# Patient Record
Sex: FEMALE | Race: OTHER | NOT HISPANIC OR LATINO | URBAN - METROPOLITAN AREA
[De-identification: names, ages, dates, MRNs, and addresses within clinical notes are randomized per-mention and may not be internally consistent; named-entity substitution may affect disease eponyms.]

---

## 2018-01-08 ENCOUNTER — EMERGENCY (EMERGENCY)
Facility: HOSPITAL | Age: 43
LOS: 1 days | End: 2018-01-08
Attending: EMERGENCY MEDICINE | Admitting: EMERGENCY MEDICINE
Payer: COMMERCIAL

## 2018-01-08 VITALS
SYSTOLIC BLOOD PRESSURE: 108 MMHG | DIASTOLIC BLOOD PRESSURE: 66 MMHG | HEART RATE: 69 BPM | TEMPERATURE: 98 F | OXYGEN SATURATION: 100 % | RESPIRATION RATE: 15 BRPM

## 2018-01-08 VITALS
DIASTOLIC BLOOD PRESSURE: 74 MMHG | SYSTOLIC BLOOD PRESSURE: 115 MMHG | TEMPERATURE: 98 F | WEIGHT: 119.05 LBS | HEIGHT: 62 IN | HEART RATE: 73 BPM | RESPIRATION RATE: 18 BRPM | OXYGEN SATURATION: 99 %

## 2018-01-08 LAB
ALBUMIN SERPL ELPH-MCNC: 4.4 G/DL — SIGNIFICANT CHANGE UP (ref 3.3–5)
ALP SERPL-CCNC: 44 U/L — SIGNIFICANT CHANGE UP (ref 40–120)
ALT FLD-CCNC: 12 U/L RC — SIGNIFICANT CHANGE UP (ref 10–45)
ANION GAP SERPL CALC-SCNC: 11 MMOL/L — SIGNIFICANT CHANGE UP (ref 5–17)
AST SERPL-CCNC: 22 U/L — SIGNIFICANT CHANGE UP (ref 10–40)
BILIRUB SERPL-MCNC: 0.5 MG/DL — SIGNIFICANT CHANGE UP (ref 0.2–1.2)
BUN SERPL-MCNC: 10 MG/DL — SIGNIFICANT CHANGE UP (ref 7–23)
CALCIUM SERPL-MCNC: 9.2 MG/DL — SIGNIFICANT CHANGE UP (ref 8.4–10.5)
CHLORIDE SERPL-SCNC: 103 MMOL/L — SIGNIFICANT CHANGE UP (ref 96–108)
CO2 SERPL-SCNC: 25 MMOL/L — SIGNIFICANT CHANGE UP (ref 22–31)
CREAT SERPL-MCNC: 0.64 MG/DL — SIGNIFICANT CHANGE UP (ref 0.5–1.3)
GLUCOSE SERPL-MCNC: 90 MG/DL — SIGNIFICANT CHANGE UP (ref 70–99)
HCG UR QL: NEGATIVE — SIGNIFICANT CHANGE UP
MAGNESIUM SERPL-MCNC: 1.9 MG/DL — SIGNIFICANT CHANGE UP (ref 1.6–2.6)
PHOSPHATE SERPL-MCNC: 3.2 MG/DL — SIGNIFICANT CHANGE UP (ref 2.5–4.5)
POTASSIUM SERPL-MCNC: 4.2 MMOL/L — SIGNIFICANT CHANGE UP (ref 3.5–5.3)
POTASSIUM SERPL-SCNC: 4.2 MMOL/L — SIGNIFICANT CHANGE UP (ref 3.5–5.3)
PROT SERPL-MCNC: 7.6 G/DL — SIGNIFICANT CHANGE UP (ref 6–8.3)
SODIUM SERPL-SCNC: 139 MMOL/L — SIGNIFICANT CHANGE UP (ref 135–145)

## 2018-01-08 PROCEDURE — 84100 ASSAY OF PHOSPHORUS: CPT

## 2018-01-08 PROCEDURE — 99284 EMERGENCY DEPT VISIT MOD MDM: CPT

## 2018-01-08 PROCEDURE — 81025 URINE PREGNANCY TEST: CPT

## 2018-01-08 PROCEDURE — 99284 EMERGENCY DEPT VISIT MOD MDM: CPT | Mod: 25

## 2018-01-08 PROCEDURE — 80053 COMPREHEN METABOLIC PANEL: CPT

## 2018-01-08 PROCEDURE — 83735 ASSAY OF MAGNESIUM: CPT

## 2018-01-08 PROCEDURE — 96374 THER/PROPH/DIAG INJ IV PUSH: CPT

## 2018-01-08 RX ORDER — SODIUM CHLORIDE 9 MG/ML
2000 INJECTION INTRAMUSCULAR; INTRAVENOUS; SUBCUTANEOUS ONCE
Qty: 0 | Refills: 0 | Status: COMPLETED | OUTPATIENT
Start: 2018-01-08 | End: 2018-01-08

## 2018-01-08 RX ORDER — MECLIZINE HCL 12.5 MG
25 TABLET ORAL ONCE
Qty: 0 | Refills: 0 | Status: COMPLETED | OUTPATIENT
Start: 2018-01-08 | End: 2018-01-08

## 2018-01-08 RX ORDER — ONDANSETRON 8 MG/1
4 TABLET, FILM COATED ORAL ONCE
Qty: 0 | Refills: 0 | Status: COMPLETED | OUTPATIENT
Start: 2018-01-08 | End: 2018-01-08

## 2018-01-08 RX ADMIN — Medication 25 MILLIGRAM(S): at 14:25

## 2018-01-08 RX ADMIN — SODIUM CHLORIDE 1333.33 MILLILITER(S): 9 INJECTION INTRAMUSCULAR; INTRAVENOUS; SUBCUTANEOUS at 14:14

## 2018-01-08 NOTE — ED ADULT NURSE NOTE - OBJECTIVE STATEMENT
43 y/o female presenting to ED c/o headaches, and dizziness since this morning. pt.states symptoms have worsened since this morning. denies chest pain, sob, blurred vision, slurred speech,  n/v/d, abdominal pain, f/c, urinary symptoms, hematuria. A&Ox4 gross neuro intact, lungs cta bilaterally, no difficulty speaking in complete sentences, s1s2 heart sounds heard, pulses x 4, rose x4, abdomen soft nontender nondistended, skin intact. Safety and comfort measures maintained. Patient undressed and placed into gown, call bell in hand and side rails up for safety. warm blanket provided, vital signs stable, pt in no acute distress.

## 2018-01-08 NOTE — ED PROVIDER NOTE - CARE PLAN
Principal Discharge DX:	Vertigo  Instructions for follow-up, activity and diet:	1) You have vertigo. Please take the medication as needed for vertigo  2) Please follow-up with your primary care doctor within the next 3 days.  Please call today or tomorrow for an appointment.  If you cannot follow-up with your doctor(s), please return to the ED for any urgent issues.  2) If you have any worsening of symptoms or any other concerns please return to the ED immediately.  3) Please continue taking your home medications as directed.  4) You may have been given a copy of your labs and/or imaging.  Please go over these with your primary care doctor. Principal Discharge DX:	Vertigo  Instructions for follow-up, activity and diet:	1) You have vertigo. Please take the medication as needed for vertigo.   2) Please follow-up with your primary care doctor within the next 3 days.  Please call today or tomorrow for an appointment.  If you cannot follow-up with your doctor(s), please return to the ED for any urgent issues.  2) If you have any worsening of symptoms or any other concerns please return to the ED immediately.  3) Please continue taking your home medications as directed.  4) You may have been given a copy of your labs and/or imaging.  Please go over these with your primary care doctor.

## 2018-01-08 NOTE — ED PROVIDER NOTE - PLAN OF CARE
1) You have vertigo. Please take the medication as needed for vertigo  2) Please follow-up with your primary care doctor within the next 3 days.  Please call today or tomorrow for an appointment.  If you cannot follow-up with your doctor(s), please return to the ED for any urgent issues.  2) If you have any worsening of symptoms or any other concerns please return to the ED immediately.  3) Please continue taking your home medications as directed.  4) You may have been given a copy of your labs and/or imaging.  Please go over these with your primary care doctor. 1) You have vertigo. Please take the medication as needed for vertigo.   2) Please follow-up with your primary care doctor within the next 3 days.  Please call today or tomorrow for an appointment.  If you cannot follow-up with your doctor(s), please return to the ED for any urgent issues.  2) If you have any worsening of symptoms or any other concerns please return to the ED immediately.  3) Please continue taking your home medications as directed.  4) You may have been given a copy of your labs and/or imaging.  Please go over these with your primary care doctor.

## 2018-01-08 NOTE — ED PROVIDER NOTE - OBJECTIVE STATEMENT
43yo F, no pmh, p/w dizziness that started yesterday. Reports the room is spinning everytime she turns her head. Also reporting head heaviness. Denies f/c, visual disturbances, numb/tingling/weakness, URI symptoms, h/o stroke. Hasn't taken anything for the dizziness. Dizziness improves when she is sitting or not moving her head. Never happened before. Complaining of some L ear pain. Denies d/c.

## 2018-01-08 NOTE — ED ADULT NURSE REASSESSMENT NOTE - NS ED NURSE REASSESS COMMENT FT1
Pt. currently resting comfortably in hallway. NAD VSS at this time. Plan of care discussed with pt. Pt currently awaiting discharge paperwork. Safety and comfort maintained.

## 2018-01-08 NOTE — ED ADULT NURSE REASSESSMENT NOTE - NS ED NURSE REASSESS COMMENT FT1
Pt d/c home w/ written and verbal instructions. Pt verbalized understanding. IV d/c. No s&s of infection/infiltration. VSS. Pt states " I am ready to go home".

## 2018-01-08 NOTE — ED PROVIDER NOTE - ATTENDING CONTRIBUTION TO CARE
42 yof no pmhx presents w feeling of room spinning starting yesterday. states better w remaining still, worse w moving head and getting into upright position. no f/c, no headache but feels head is "heavy." + nausea, no vomiting. no vision changes or ringing in her hears. states has some pain near her left ear x weeks.     ROS:   constitutional - no fever, no chills  eyes - no visual changes, no redness  eent - no sore throat, no nasal congestion  cvs - no chest pain, no leg swelling  resp - no shortness of breath, no cough  gi - no abdominal pain, no vomiting, no diarrhea  gu - no dysuria, no hematuria  msk - no acute back pain, no joint swelling  skin - no rashes, no jaundice  neuro - no headache, no focal weakness  psych - no acute mental health issue     Physical Exam:   constitutional - well appearing, awake and alert, oriented x3  head - no external evidence of trauma  cvs - rrr, no murmurs, no peripheral edema  resp - breath sounds clear and equal bilat  gi - abdomen soft and nontender, no rigidity, guarding or rebound, bowel sounds present  msk - moving all extremities spontaneously  neuro - alert and oriented x3, no focal deficits, CNs 2-12 grossly intact. minimal rightward nystagmus. no pronator drift, strength 5/5 in all ext. gait stable, neg rombergs.   skin- no jaundice, warm and dry  psych - mood and affect wnl, no apparent risk to self or others     pt w likely peripheral vertigo. no focal findings to suggest intracranial pathology. pt is well appearing. will give ivf, zofran, meclizine, reassess. endorsed to dr Araujo at 1600 pending reassessment. SCOTTY Quinn MD

## 2018-01-08 NOTE — ED PROVIDER NOTE - MEDICAL DECISION MAKING DETAILS
43yo F, no pmh, p/w dizziness that started yesterday. Likely vertigo. No concerning findings on PE for central vertigo. Will eval with basics, ivfs, meclizine, reassess.

## 2018-01-08 NOTE — ED PROVIDER NOTE - PHYSICAL EXAMINATION
Vitals: WNL  Gen: very anxious  Head: NCAT  ENT: PERRLA, EOMI, sclerae white, anicterus, moist mucous membranes. No exudates, b/l TM clear/grey, no effusions, no erythema in the ear canal  CV: RRR. Audible S1 and S2. No murmurs, rubs, gallops, S3, nor S4, 2+ radial and DP pulses   Pulm: Clear to auscultation bilaterally. No wheezes, rales, or rhonchi  Abd: soft, normoactive BS x4, NTND, no rebound, no guarding, no rashes  Musculoskeletal:  No peripheral edema  Skin: no lesions or scars noted  Neurologic: AAOx3

## 2019-06-03 NOTE — ED ADULT TRIAGE NOTE - BANDS:
Allergy;

## 2024-06-04 PROBLEM — Z00.00 ENCOUNTER FOR PREVENTIVE HEALTH EXAMINATION: Status: ACTIVE | Noted: 2024-06-04

## 2024-06-05 ENCOUNTER — APPOINTMENT (OUTPATIENT)
Dept: ORTHOPEDIC SURGERY | Facility: CLINIC | Age: 49
End: 2024-06-05
Payer: COMMERCIAL

## 2024-06-05 VITALS — BODY MASS INDEX: 24.84 KG/M2 | HEIGHT: 62 IN | WEIGHT: 135 LBS

## 2024-06-05 DIAGNOSIS — Z78.9 OTHER SPECIFIED HEALTH STATUS: ICD-10-CM

## 2024-06-05 DIAGNOSIS — S90.32XA CONTUSION OF LEFT FOOT, INITIAL ENCOUNTER: ICD-10-CM

## 2024-06-05 PROCEDURE — 73650 X-RAY EXAM OF HEEL: CPT | Mod: LT

## 2024-06-05 PROCEDURE — 73610 X-RAY EXAM OF ANKLE: CPT | Mod: LT

## 2024-06-05 PROCEDURE — 99203 OFFICE O/P NEW LOW 30 MIN: CPT

## 2024-06-05 RX ORDER — METHYLPREDNISOLONE 4 MG/1
4 TABLET ORAL
Qty: 1 | Refills: 0 | Status: ACTIVE | COMMUNITY
Start: 2024-06-05 | End: 1900-01-01
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
## 2024-06-05 NOTE — PHYSICAL EXAM
[de-identified] : Constitutional: Well developed, well nourished, able to communicate Neuro: Normal sensation, No focal deficits Skin: Intact CV: Peripheral vascular exam grossly normal Pulm: No signs of respiratory distress Psych: Oriented, normal mood and affect

## 2024-06-05 NOTE — IMAGING
[de-identified] : L ankle/foot: - No obvious ankle or foot deformity - Pain to retrocalcaneal bursa - No pain to plantar fascia - Mild pain with palpation of achilles - No pain with palpation medial or lateral malleoli, base of 5th metatarsal, navicular, metatarsals, or digits - ROM intact throughout ankle dorsiflexion, plantarflexion, inversion, eversion. Toes with normal flexion and extension. - 5/5 strength throughout - Negative anterior drawer, ankle squeeze test - Negative metatarsal squeeze - Mild pain with calcaneal squeeze - No pain with soleus or gastroc activation - negative shaffer - Distally neurovascularly intact   R ankle/foot: - No obvious ankle or foot deformity - No pain with palpation of achilles, medial or lateral malleoli, base of 5th metatarsal, navicular, metatarsals, or digits - ROM intact throughout ankle dorsiflexion, plantarflexion, inversion, eversion. Toes with normal flexion and extension. - 5/5 strength throughout - Distally neurovascularly intact    [Left] : left calcaneus [There are no fractures, subluxations or dislocations. No significant abnormalities are seen] : There are no fractures, subluxations or dislocations. No significant abnormalities are seen
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
## 2024-06-18 ENCOUNTER — APPOINTMENT (OUTPATIENT)
Dept: ORTHOPEDIC SURGERY | Facility: CLINIC | Age: 49
End: 2024-06-18
Payer: COMMERCIAL

## 2024-06-18 DIAGNOSIS — M76.62 ACHILLES TENDINITIS, LEFT LEG: ICD-10-CM

## 2024-06-18 DIAGNOSIS — M77.52 OTHER ENTHESOPATHY OF LT FOOT AND ANKLE: ICD-10-CM

## 2024-06-18 PROCEDURE — 72110 X-RAY EXAM L-2 SPINE 4/>VWS: CPT

## 2024-06-18 PROCEDURE — 99214 OFFICE O/P EST MOD 30 MIN: CPT

## 2024-06-18 RX ORDER — MELOXICAM 15 MG/1
15 TABLET ORAL
Qty: 30 | Refills: 1 | Status: ACTIVE | COMMUNITY
Start: 2024-06-18 | End: 1900-01-01
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
## 2024-06-19 NOTE — IMAGING
[Left] : left calcaneus [There are no fractures, subluxations or dislocations. No significant abnormalities are seen] : There are no fractures, subluxations or dislocations. No significant abnormalities are seen [de-identified] : L ankle/foot: - No obvious ankle or foot deformity - Pain to retrocalcaneal bursa - No pain to plantar fascia - Mild pain with palpation of achilles - No pain with palpation medial or lateral malleoli, base of 5th metatarsal, navicular, metatarsals, or digits - ROM intact throughout ankle dorsiflexion, plantarflexion, inversion, eversion. Toes with normal flexion and extension. - 5/5 strength throughout - Negative anterior drawer, ankle squeeze test - Negative metatarsal squeeze - Mild pain with calcaneal squeeze - No pain with soleus or gastroc activation - negative shaffer - Distally neurovascularly intact   R ankle/foot: - No obvious ankle or foot deformity - No pain with palpation of achilles, medial or lateral malleoli, base of 5th metatarsal, navicular, metatarsals, or digits - ROM intact throughout ankle dorsiflexion, plantarflexion, inversion, eversion. Toes with normal flexion and extension. - 5/5 strength throughout - Distally neurovascularly intact  Back: - No obvious deformity - No pain with palpation of spinous processes or paraspinal musculature - No pain with SI palpation  - ROM intact throughout flexion, extension, sidebending, and rotation - 3+/5 strength with L ankle plantarflexion - 5/5 strength throughout LE evaluation bilaterally otherwise - Tightness felt down back of L leg to ankle with SLR - Negative straight leg raise R - Distally neurovascularly intact    [Disc space narrowing] : Disc space narrowing

## 2024-06-19 NOTE — PHYSICAL EXAM
[de-identified] : Constitutional: Well developed, well nourished, able to communicate Neuro: Normal sensation, No focal deficits Skin: Intact CV: Peripheral vascular exam grossly normal Pulm: No signs of respiratory distress Psych: Oriented, normal mood and affect
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
## 2024-07-01 DIAGNOSIS — M54.16 RADICULOPATHY, LUMBAR REGION: ICD-10-CM

## 2024-07-01 DIAGNOSIS — M47.26 OTHER SPONDYLOSIS WITH RADICULOPATHY, LUMBAR REGION: ICD-10-CM

## 2024-07-01 DIAGNOSIS — R29.898 OTHER SYMPTOMS AND SIGNS INVOLVING THE MUSCULOSKELETAL SYSTEM: ICD-10-CM

## 2024-07-11 ENCOUNTER — APPOINTMENT (OUTPATIENT)
Dept: NEUROLOGY | Facility: CLINIC | Age: 49
End: 2024-07-11
Payer: COMMERCIAL

## 2024-07-11 PROCEDURE — 95913 NRV CNDJ TEST 13/> STUDIES: CPT

## 2024-07-11 PROCEDURE — 95886 MUSC TEST DONE W/N TEST COMP: CPT

## 2024-07-23 ENCOUNTER — APPOINTMENT (OUTPATIENT)
Dept: ORTHOPEDIC SURGERY | Facility: CLINIC | Age: 49
End: 2024-07-23

## 2024-07-23 DIAGNOSIS — M54.16 RADICULOPATHY, LUMBAR REGION: ICD-10-CM

## 2024-07-23 DIAGNOSIS — R29.898 OTHER SYMPTOMS AND SIGNS INVOLVING THE MUSCULOSKELETAL SYSTEM: ICD-10-CM

## 2024-07-23 DIAGNOSIS — M77.52 OTHER ENTHESOPATHY OF LT FOOT AND ANKLE: ICD-10-CM

## 2024-07-23 DIAGNOSIS — G57.52 TARSAL TUNNEL SYNDROME, LEFT LOWER LIMB: ICD-10-CM

## 2024-07-23 DIAGNOSIS — M47.26 OTHER SPONDYLOSIS WITH RADICULOPATHY, LUMBAR REGION: ICD-10-CM

## 2024-07-23 DIAGNOSIS — M76.62 ACHILLES TENDINITIS, LEFT LEG: ICD-10-CM

## 2024-07-23 PROCEDURE — J3490M: CUSTOM

## 2024-07-23 PROCEDURE — 20550 NJX 1 TENDON SHEATH/LIGAMENT: CPT | Mod: LT

## 2024-07-23 PROCEDURE — 76942 ECHO GUIDE FOR BIOPSY: CPT

## 2024-07-23 PROCEDURE — 99214 OFFICE O/P EST MOD 30 MIN: CPT | Mod: 25

## 2024-07-23 NOTE — PHYSICAL EXAM
[de-identified] : Constitutional: Well developed, well nourished, able to communicate Neuro: Normal sensation, No focal deficits Skin: Intact CV: Peripheral vascular exam grossly normal Pulm: No signs of respiratory distress Psych: Oriented, normal mood and affect
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
## 2024-07-23 NOTE — IMAGING
[Disc space narrowing] : Disc space narrowing [de-identified] : L ankle/foot: - No obvious ankle or foot deformity - Pain to retrocalcaneal bursa, moreso medially - No pain to plantar fascia - No pain with palpation of achilles - No pain with palpation medial or lateral malleoli, base of 5th metatarsal, navicular, metatarsals, or digits - ROM intact throughout ankle dorsiflexion, plantarflexion, inversion, eversion. Toes with normal flexion and extension. - 5/5 strength throughout - Negative anterior drawer, ankle squeeze test - Negative metatarsal squeeze - Mild pain with calcaneal squeeze - No pain with soleus or gastroc activation - negative shaffer - Distally neurovascularly intact   R ankle/foot: - No obvious ankle or foot deformity - No pain with palpation of achilles, medial or lateral malleoli, base of 5th metatarsal, navicular, metatarsals, or digits - ROM intact throughout ankle dorsiflexion, plantarflexion, inversion, eversion. Toes with normal flexion and extension. - 5/5 strength throughout - Distally neurovascularly intact  Back: - No obvious deformity - No pain with palpation of spinous processes or paraspinal musculature - No pain with SI palpation  - ROM intact throughout flexion, extension, sidebending, and rotation - 5/5 strength throughout LE evaluation - Tightness felt down back of L leg to ankle with SLR - Negative straight leg raise R - Distally neurovascularly intact

## 2024-07-23 NOTE — PROCEDURE
[FreeTextEntry3] : A pre-procedure verification was performed by asking the patient to state their name, , and the location of the procedure being performed in their own words.  A review of allergies was accomplished through dialog and the patient, ending thus in the full agreement. The risks and benefits were explained and consent were obtained verbally.  Procedure: Ultrasound Guided Injection >>  Left tarsal tunnel Consent was obtained. Patients questions were answered to the best of my ability prior to procedure. Injection site and surrounding bony landmarks were palpated, and marked prior to proceeding. Target location and needle pathway identified under ultrasound. Ultrasound probe was sanitized in the typical fashion prior to application of sterile gel. Injection site was cleaned and prepped in the typical fashion using alcohol swabs. Cold spray used on skin for patient comfort. Site was identified once again with ultrasound. Needle was advanced into the tarsal tunnel space from the medial approach. A combination of 0.5cc Lidocaine w/o Epi, 0.5cc Marcaine w/o Epi and 40mg Depomedrol was then injected into the tarsal tunnel space. Patient tolerated the procedure well, without significant complications. Minimal (<3cc) blood loss experienced. Images were saved into patient's chart.

## 2024-07-23 NOTE — DATA REVIEWED
[Left] : left [Ankle] : ankle [MRI] : MRI [Lumbar Spine] : lumbar spine [Report was reviewed and noted in the chart] : The report was reviewed and noted in the chart [I reviewed the films/CD and agree] : I reviewed the films/CD and agree
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
## 2024-08-21 ENCOUNTER — APPOINTMENT (OUTPATIENT)
Dept: ORTHOPEDIC SURGERY | Facility: CLINIC | Age: 49
End: 2024-08-21
Payer: COMMERCIAL

## 2024-08-21 DIAGNOSIS — G57.52 TARSAL TUNNEL SYNDROME, LEFT LOWER LIMB: ICD-10-CM

## 2024-08-21 DIAGNOSIS — M47.26 OTHER SPONDYLOSIS WITH RADICULOPATHY, LUMBAR REGION: ICD-10-CM

## 2024-08-21 DIAGNOSIS — M76.62 ACHILLES TENDINITIS, LEFT LEG: ICD-10-CM

## 2024-08-21 DIAGNOSIS — M77.52 OTHER ENTHESOPATHY OF LT FOOT AND ANKLE: ICD-10-CM

## 2024-08-21 DIAGNOSIS — R29.898 OTHER SYMPTOMS AND SIGNS INVOLVING THE MUSCULOSKELETAL SYSTEM: ICD-10-CM

## 2024-08-21 PROCEDURE — 99213 OFFICE O/P EST LOW 20 MIN: CPT
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
## 2024-08-22 NOTE — PHYSICAL EXAM
[de-identified] : Constitutional: Well developed, well nourished, able to communicate Neuro: Normal sensation, No focal deficits Skin: Intact CV: Peripheral vascular exam grossly normal Pulm: No signs of respiratory distress Psych: Oriented, normal mood and affect

## 2024-08-22 NOTE — IMAGING
[Disc space narrowing] : Disc space narrowing [de-identified] : L ankle/foot: - No obvious ankle or foot deformity - mild Pain to retrocalcaneal bursa, moreso medially - No pain to plantar fascia - No pain with palpation of achilles - No pain with palpation medial or lateral malleoli, base of 5th metatarsal, navicular, metatarsals, or digits - ROM intact throughout ankle dorsiflexion, plantarflexion, inversion, eversion. Toes with normal flexion and extension. - 5/5 strength throughout - Negative anterior drawer, ankle squeeze test - Negative metatarsal squeeze - no pain with calcaneal squeeze - No pain with soleus or gastroc activation - negative shaffer - Distally neurovascularly intact   R ankle/foot: - No obvious ankle or foot deformity - No pain with palpation of achilles, medial or lateral malleoli, base of 5th metatarsal, navicular, metatarsals, or digits - ROM intact throughout ankle dorsiflexion, plantarflexion, inversion, eversion. Toes with normal flexion and extension. - 5/5 strength throughout - Distally neurovascularly intact  Back: - No obvious deformity - No pain with palpation of spinous processes or paraspinal musculature - No pain with SI palpation  - ROM intact throughout flexion, extension, sidebending, and rotation - 5/5 strength throughout LE evaluation - Tightness felt down back of L leg to ankle with SLR - Negative straight leg raise R - Distally neurovascularly intact

## 2024-08-22 NOTE — IMAGING
[Disc space narrowing] : Disc space narrowing [de-identified] : L ankle/foot: - No obvious ankle or foot deformity - mild Pain to retrocalcaneal bursa, moreso medially - No pain to plantar fascia - No pain with palpation of achilles - No pain with palpation medial or lateral malleoli, base of 5th metatarsal, navicular, metatarsals, or digits - ROM intact throughout ankle dorsiflexion, plantarflexion, inversion, eversion. Toes with normal flexion and extension. - 5/5 strength throughout - Negative anterior drawer, ankle squeeze test - Negative metatarsal squeeze - no pain with calcaneal squeeze - No pain with soleus or gastroc activation - negative shaffer - Distally neurovascularly intact   R ankle/foot: - No obvious ankle or foot deformity - No pain with palpation of achilles, medial or lateral malleoli, base of 5th metatarsal, navicular, metatarsals, or digits - ROM intact throughout ankle dorsiflexion, plantarflexion, inversion, eversion. Toes with normal flexion and extension. - 5/5 strength throughout - Distally neurovascularly intact  Back: - No obvious deformity - No pain with palpation of spinous processes or paraspinal musculature - No pain with SI palpation  - ROM intact throughout flexion, extension, sidebending, and rotation - 5/5 strength throughout LE evaluation - Tightness felt down back of L leg to ankle with SLR - Negative straight leg raise R - Distally neurovascularly intact

## 2024-08-22 NOTE — PHYSICAL EXAM
[de-identified] : Constitutional: Well developed, well nourished, able to communicate Neuro: Normal sensation, No focal deficits Skin: Intact CV: Peripheral vascular exam grossly normal Pulm: No signs of respiratory distress Psych: Oriented, normal mood and affect

## 2024-08-22 NOTE — ASSESSMENT
[FreeTextEntry1] : Now 4.5 months of left heel pain after hitting her left heel against someone's foot.  Seen by podiatry where she had x-rays and MRI completed.  X-ray without fracture.  MRI showing mild distal Achilles tendinosis and inflammation in the heel fat pad per report. Images personally reviewed and concur, mild achilles tendinopathy with longitudinal tear.  X-rays obtained of the ankle and calcaneus without acute or degenerative findings.  On exam, she has pain to the retrocalcaneal space and no pain over the Achilles tendon.  Today, no pain with calcaneal squeeze. Improved single heel raise against gravity. No improvement with medrol or camboot. XR of lumbar spine showing L5-S1 narrowing. MRI lumbar spine with DDD without significant nerve root impingement. NCS completed without significant findings. 60-70% better after tarsal tunnel CSI on 7/23/24 - Continue PT and HEP - Meloxicam qd as needed - 2nd opinion from Dr. Clark given prolonged and ongoing symptoms

## 2024-09-09 ENCOUNTER — APPOINTMENT (OUTPATIENT)
Dept: ORTHOPEDIC SURGERY | Facility: CLINIC | Age: 49
End: 2024-09-09
Payer: COMMERCIAL

## 2024-09-09 DIAGNOSIS — M67.874 OTHER SPECIFIED DISORDERS OF TENDON, LEFT ANKLE AND FOOT: ICD-10-CM

## 2024-09-09 PROCEDURE — 99204 OFFICE O/P NEW MOD 45 MIN: CPT

## 2024-09-09 NOTE — HISTORY OF PRESENT ILLNESS
[de-identified] : Patient is a 49 year old female here for her left Achilles. Reports she developed posterior ankle pain after her daughter accidently stepped on her heel May 2024. She has been evaluated by podiatry and by Dr. Villegas. WB in sneakers. Normal EMG. LT tarsal tunnel injection on 7/23/24 with some relief. Has tried Mobic and Medrol dose pack. She has been doing PT. Symptoms are improving. No previous injury/problem with LT ankle.  [] : no [FreeTextEntry1] : left achilles.

## 2024-09-09 NOTE — PHYSICAL EXAM
[Left] : left foot and ankle [NL (40)] : plantar flexion 40 degrees [NL 30)] : inversion 30 degrees [5___] : eversion 5[unfilled]/5 [2+] : posterior tibialis pulse: 2+ [Normal] : saphenous nerve sensation normal [NL (20)] : dorsiflexion 20 degrees [] : non-antalgic [TWNoteComboBox7] : False

## 2024-09-09 NOTE — DISCUSSION/SUMMARY
[de-identified] : WBAT in supportive footwear, heel lifts prn.   Recommend continue with HEP. Ice to affected area. NSAIDS prn.

## 2024-09-09 NOTE — DATA REVIEWED
[Outside X-rays] : outside x-rays [Left] : left [Ankle] : ankle [I reviewed the films/CD] : I reviewed the films/CD [FreeTextEntry1] : [facility] 6/5/24: No fractures or bony abnormalities noted.

## 2024-09-12 ENCOUNTER — APPOINTMENT (OUTPATIENT)
Dept: ORTHOPEDIC SURGERY | Facility: CLINIC | Age: 49
End: 2024-09-12

## 2024-10-22 ENCOUNTER — APPOINTMENT (OUTPATIENT)
Dept: ORTHOPEDIC SURGERY | Facility: CLINIC | Age: 49
End: 2024-10-22

## 2025-08-30 ENCOUNTER — NON-APPOINTMENT (OUTPATIENT)
Age: 50
End: 2025-08-30